# Patient Record
Sex: MALE | Race: WHITE | RURAL
[De-identification: names, ages, dates, MRNs, and addresses within clinical notes are randomized per-mention and may not be internally consistent; named-entity substitution may affect disease eponyms.]

---

## 2017-01-12 ENCOUNTER — TELEPHONE (OUTPATIENT)
Dept: INTERNAL MEDICINE CLINIC | Age: 72
End: 2017-01-12

## 2017-01-12 NOTE — TELEPHONE ENCOUNTER
Patients caregiver called saying if anything is needed for patient to give her a call. If he needs to be seen by Dr. James Jackson she would like a call. She can be reached at [phone].

## 2017-01-30 ENCOUNTER — OFFICE VISIT (OUTPATIENT)
Dept: INTERNAL MEDICINE CLINIC | Age: 72
End: 2017-01-30

## 2017-01-30 VITALS
SYSTOLIC BLOOD PRESSURE: 132 MMHG | DIASTOLIC BLOOD PRESSURE: 82 MMHG | HEART RATE: 102 BPM | WEIGHT: 170 LBS | OXYGEN SATURATION: 94 % | HEIGHT: 69 IN | RESPIRATION RATE: 18 BRPM | BODY MASS INDEX: 25.18 KG/M2 | TEMPERATURE: 95.6 F

## 2017-01-30 DIAGNOSIS — R32 INCONTINENCE: ICD-10-CM

## 2017-01-30 DIAGNOSIS — M54.40 CHRONIC LOW BACK PAIN WITH SCIATICA, SCIATICA LATERALITY UNSPECIFIED, UNSPECIFIED BACK PAIN LATERALITY: ICD-10-CM

## 2017-01-30 DIAGNOSIS — N39.0 URINARY TRACT INFECTION, SITE UNSPECIFIED: ICD-10-CM

## 2017-01-30 DIAGNOSIS — R25.1 TREMOR OF RIGHT HAND: Primary | ICD-10-CM

## 2017-01-30 DIAGNOSIS — G89.29 CHRONIC LOW BACK PAIN WITH SCIATICA, SCIATICA LATERALITY UNSPECIFIED, UNSPECIFIED BACK PAIN LATERALITY: ICD-10-CM

## 2017-01-30 DIAGNOSIS — R91.8 LUNG NODULES: ICD-10-CM

## 2017-01-30 PROBLEM — S48.912A AMPUTATION OF LEFT ARM (HCC): Status: ACTIVE | Noted: 2017-01-30

## 2017-01-30 RX ORDER — SULFAMETHOXAZOLE AND TRIMETHOPRIM 400; 80 MG/1; MG/1
1 TABLET ORAL 2 TIMES DAILY
Qty: 14 TAB | Refills: 0 | Status: SHIPPED | OUTPATIENT
Start: 2017-01-30 | End: 2017-02-06

## 2017-01-30 RX ORDER — ASPIRIN 325 MG
325 TABLET ORAL DAILY
Qty: 90 TAB | Refills: 3 | Status: SHIPPED | OUTPATIENT
Start: 2017-01-30

## 2017-01-30 NOTE — PROGRESS NOTES
HISTORY OF PRESENT ILLNESS  Gloria Block is a 66 y.o. male. Tremors   The history is provided by the patient and caregiver. This is a recurrent problem. The current episode started yesterday. The problem occurs constantly. The problem has not changed since onset. Pertinent negatives include no chest pain and no shortness of breath. here with his niece.  this occurred last year in November and she took him to [facility].   was diagnosed with a urinary tract infection and treated with an antibiotic. The tremor eventually resolved. He does not complain of dysuria. Today so far after drinking copious amounts of water has not been able to give us a urine sample. The tremor has left him unable to feed or use the restroom on his own. Given the left arm amputation this is a significant problem. Has had lung nodules seen on CAT scan of his lungs. Is scheduled to undergo biopsy at [address]. His aspirin has been stopped for the procedure. Is being seen at the [facility] for his chronic back pain. Currently changing his pain medications from oxycodone to methadone.   Past Medical History   Diagnosis Date    Allergic rhinitis     Amputation of arm ([address] left    Anxiety     Asthma     Below knee amputation status (HCC) right    Chronic obstructive pulmonary disease (HCC)     Depression     Hypercholesterolemia     Hypertension     Insomnia     Osteomyelitis of ankle, acute (HCC)     Polyneuropathy      Social History     Social History    Marital status:      Spouse name: N/A    Number of children: 3    Years of education: N/A     Occupational History    retired      Social History Main Topics    Smoking status: Current Every Day Smoker     Packs/day: 1.00     Types: Cigarettes     Start date: 6/1/1956    Smokeless tobacco: Not on file    Alcohol use No    Drug use: No    Sexual activity: No     Other Topics Concern    Not on file     Social History Narrative         Review of Systems   Respiratory: Negative for shortness of breath. Cardiovascular: Negative for chest pain. Genitourinary: Negative for dysuria. Neurological: Positive for tremors and focal weakness. Physical Exam  Visit Vitals    /82 (BP 1 Location: Left arm, BP Patient Position: Sitting)    Pulse (!) 102    Temp 95.6 °F (35.3 °C) (Oral)    Resp 18    Ht 5' 9\" (1.753 m)    Wt 170 lb (77.1 kg)    SpO2 94%    BMI 25.1 kg/m2     Well developed well nourished no acute distress. Pupils equal round react to light, extraocular muscles intact. Tympanic membranes within normal limits throat unremarkable  Cranial nerves II through XII are intact. Neck unremarkable  Heart regular rate and rhythm without clicks murmurs rubs  Lungs are clear to auscultation  Abdomen soft. Extremities, left arm amputation. Right arm distal tremor reduced with extension fourth and fifth fingers slightly flexed. Tremors when he attempts to drink or use the hand. ASSESSMENT and PLAN  Encounter Diagnoses   Name Primary?  Tremor of right hand Yes    Incontinence     Lung nodules     Urinary tract infection, site unspecified     Chronic low back pain with sciatica, sciatica laterality unspecified, unspecified back pain laterality      Orders Placed This Encounter    CULTURE, URINE    URINALYSIS W/ RFLX MICROSCOPIC    aspirin (ASPIRIN) 325 mg tablet    trimethoprim-sulfamethoxazole (BACTRIM, SEPTRA)  mg per tablet     Certainly am not sure what is causing this problem. We can try a course of antibiotics. With the help of his niece, he will try and give us a urine sample before starting the above antibiotics. She can take the urine to the closest medical facility for analysis and then start him on the antibiotic. Did discuss the possibility of stroke.   He is declining wanting to go to the emergency room, but if the symptoms worsen his niece will take him there. Back pain per pain management. His niece will call [facility] to delay his lung nodule biopsies. Follow-up Disposition:  Return in about 1 week (around 2/6/2017) for routine follow up.

## 2017-01-30 NOTE — MR AVS SNAPSHOT
Visit Information Date & Time Provider Department Dept. Phone Encounter #  
 1/30/2017 10:50 AM Les Palomo  Amende  [id number] Follow-up Instructions Return in about 1 week (around 2/6/2017) for routine follow up. Upcoming Health Maintenance Date Due DTaP/Tdap/Td series (1 - Tdap) 7/14/1964 FOBT Q 1 YEAR AGE 50-75 7/14/1993 ZOSTER VACCINE AGE 60> 7/14/2003 GLAUCOMA SCREENING Q2Y 7/14/2008 Pneumococcal 65+ Low/Medium Risk (1 of 2 - PCV13) 7/14/2008 MEDICARE YEARLY EXAM 7/14/2008 Allergies as of 1/30/2017  Review Complete On: 1/30/2017 By: Les Palomo MD  
  
 Severity Noted Reaction Type Reactions Morphine  03/07/2016    Other (comments)  
 patient states he can take 4/5/16 patient states he has a chemical reaction on Morphine Current Immunizations  Never Reviewed Name Date Influenza High Dose Vaccine PF 11/9/2016 Not reviewed this visit You Were Diagnosed With   
  
 Codes Comments Tremor of right hand    -  Primary ICD-10-CM: R25.1 ICD-9-CM: 781.0 Incontinence     ICD-10-CM: R32 
ICD-9-CM: 788.30 Vitals BP Pulse Temp Resp Height(growth percentile) Weight(growth percentile) 132/82 (BP 1 Location: Left arm, BP Patient Position: Sitting) (!) 102 95.6 °F (35.3 °C) (Oral) 18 5' 9\" (1.753 m) 170 lb (77.1 kg) SpO2 BMI Smoking Status 94% 25.1 kg/m2 Current Every Day Smoker BMI and BSA Data Body Mass Index Body Surface Area  
 25.1 kg/m 2 1.94 m 2 Preferred Pharmacy Pharmacy Name Phone [facility] [address] [facility], [address] [place] [address] Street  Your Updated Medication List  
  
   
This list is accurate as of: 1/30/17 11:48 AM.  Always use your most recent med list.  
  
  
  
  
 albuterol 90 mcg/actuation inhaler Commonly known as:  PROVENTIL HFA, VENTOLIN HFA, PROAIR HFA  
 Take 2 Puffs by inhalation every twelve (12) hours as needed for Wheezing. albuterol-ipratropium 2.5 mg-0.5 mg/3 ml Nebu Commonly known as:  DUO-NEB  
3 mL by Nebulization route every eight (8) hours as needed. Indications: CHRONIC OBSTRUCTIVE PULMONARY DISEASE WITH BRONCHOSPASMS  
  
 aspirin 325 mg tablet Commonly known as:  ASPIRIN Take 1 Tab by mouth daily. DULoxetine 30 mg capsule Commonly known as:  CYMBALTA Take 1 Cap by mouth two (2) times a day. Indications: ANXIETY WITH DEPRESSION  
  
 fluticasone 50 mcg/actuation nasal spray Commonly known as:  [place] 2 Sprays by Both Nostrils route daily. food supplemt, lactose-reduced Liqd Commonly known as:  ENSURE Take 237 mL by mouth three (3) times daily. loratadine 10 mg tablet Commonly known as:  CLARITIN  
TAKE 1 TABLET BY MOUTH EVERY DAY  
  
 oxyCODONE IR 15 mg immediate release tablet Commonly known as:  OXY-IR Take 1 Tab by mouth every eight (8) hours as needed for Pain. Max Daily Amount: 45 mg. This is the last narcotic prescription that I will write for the chronic pain condition  
  
 tamsulosin 0.4 mg capsule Commonly known as:  FLOMAX TAKE ONE CAPSULE BY MOUTH EVERY DAY  
  
 tiotropium 18 mcg inhalation capsule Commonly known as:  Blank Estimable Take 1 Cap by inhalation daily. traZODone 150 mg tablet Commonly known as:  Landa Theo Take 1 Tab by mouth nightly. triamcinolone acetonide 0.025 % topical cream  
Commonly known as:  KENALOG Apply 0.5 g to affected area two (2) times a day. use thin layer  
  
 trimethoprim-sulfamethoxazole  mg per tablet Commonly known as:  [place] Take 1 Tab by mouth two (2) times a day for 7 days. Prescriptions Sent to Pharmacy Refills  
 aspirin (ASPIRIN) 325 mg tablet 3 Sig: Take 1 Tab by mouth daily. Class: Normal  
 Pharmacy: [facility] [address] [place] [address] [address] Ph #: [phone] Route: Oral  
 trimethoprim-sulfamethoxazole (BACTRIM, SEPTRA)  mg per tablet 0 Sig: Take 1 Tab by mouth two (2) times a day for 7 days. Class: Normal  
 Pharmacy: [facility] [address] [place] [address] [address]  #: [phone] Route: Oral  
  
We Performed the Following CULTURE, URINE [id number] CPT(R)] URINALYSIS W/ RFLX MICROSCOPIC [44636 CPT(R)] Follow-up Instructions Return in about 1 week (around 2/6/2017) for routine follow up. Introducing [facility]! Shanda Bond introduces [employer] patient portal. Now you can access parts of your medical record, email your doctor's office, and request medication refills online. 1. In your internet browser, go to [email or web address] 2. Click on the First Time User? Click Here link in the Sign In box. You will see the New Member Sign Up page. 3. Enter your [employer] Access Code exactly as it appears below. You will not need to use this code after youve completed the sign-up process. If you do not sign up before the expiration date, you must request a new code. · [employer] Access Code: [email or web address] Expires: 1/31/2017  2:01 PM 
 
4. Enter the last four digits of your Social Security Number (xxxx) and Date of Birth (mm/dd/yyyy) as indicated and click Submit. You will be taken to the next sign-up page. 5. Create a [employer] ID. This will be your [employer] login ID and cannot be changed, so think of one that is secure and easy to remember. 6. Create a [employer] password. You can change your password at any time. 7. Enter your Password Reset Question and Answer. This can be used at a later time if you forget your password. 8. Enter your e-mail address. You will receive e-mail notification when new information is available in [address] 9. Click Sign Up. You can now view and download portions of your medical record.  
10. Click the Download Summary menu link to download a portable copy of your medical information. If you have questions, please visit the Frequently Asked Questions section of the [employer] website. Remember, [employer] is NOT to be used for urgent needs. For medical emergencies, dial 911. Now available from your iPhone and Android! Please provide this summary of care documentation to your next provider. If you have any questions after today's visit, please call [phone].

## 2017-01-30 NOTE — PROGRESS NOTES
Chief Complaint   Patient presents with    Tremors     tremor to R/hand, hand blas     I have reviewed the patient's medical history in detail and updated the computerized patient record. Health Maintenance reviewed. 1. Have you been to the ER, urgent care clinic since your last visit? Hospitalized since your last visit?no    2. Have you seen or consulted any other health care providers outside of the [address] [place] [address] since your last visit? Include any pap smears or colon screening.  no

## 2017-02-13 ENCOUNTER — TELEPHONE (OUTPATIENT)
Dept: INTERNAL MEDICINE CLINIC | Age: 72
End: 2017-02-13

## 2017-02-13 RX ORDER — CIPROFLOXACIN 500 MG/1
500 TABLET ORAL 2 TIMES DAILY
Qty: 20 TAB | Refills: 0 | Status: SHIPPED | OUTPATIENT
Start: 2017-02-13 | End: 2017-02-23

## 2017-02-13 NOTE — TELEPHONE ENCOUNTER
Cristina Gutierrez, called in reference to wanting to know if antibiotic that patient was using can be called in again. She said that he was doing better but is starting to feel bad again. Ms. Cristina Gutierrez said she doesn't think patient had enough of the medication. Please call her at [phone].

## 2017-02-13 NOTE — TELEPHONE ENCOUNTER
Return call and pt took his antib for a UTI, two days after fininishing the antib, all symtoms are coming back wanted to know if another round of [facility] be [facility]

## 2017-02-16 ENCOUNTER — TELEPHONE (OUTPATIENT)
Dept: INTERNAL MEDICINE CLINIC | Age: 72
End: 2017-02-16

## 2017-02-16 NOTE — TELEPHONE ENCOUNTER
Mika Zavala, called in reference to patient needing a new wheelchair. [facility] care fax is [phone] and [phone] is phone number. They need a prescription with reason medically necessary, and separate sheet specifying why its actually necessary that he cant use walker or crutches. Insurance information and demographics is needed. Please fax over this information.

## 2017-02-17 ENCOUNTER — TELEPHONE (OUTPATIENT)
Dept: INTERNAL MEDICINE CLINIC | Age: 72
End: 2017-02-17

## 2017-02-17 PROBLEM — Z89.511 STATUS POST BELOW KNEE AMPUTATION OF RIGHT LOWER EXTREMITY (HCC): Status: ACTIVE | Noted: 2017-02-17

## 2017-02-17 NOTE — TELEPHONE ENCOUNTER
Faxed snapshot, RX for W/C with diagnosis and ins information to [facility] [phone]

## 2017-03-02 ENCOUNTER — TELEPHONE (OUTPATIENT)
Dept: INTERNAL MEDICINE CLINIC | Age: 72
End: 2017-03-02

## 2017-03-02 NOTE — TELEPHONE ENCOUNTER
----- Message from Popeye Sandra sent at 3/2/2017 10:46 AM EST -----  Regarding: Np Fontaine/telephone  The patient's caregiver Shubham Ansari is requesting a call back from Marion Higginbotham.  (m)[phone]

## 2017-03-06 ENCOUNTER — TELEPHONE (OUTPATIENT)
Dept: INTERNAL MEDICINE CLINIC | Age: 72
End: 2017-03-06

## 2017-03-06 NOTE — TELEPHONE ENCOUNTER
Talked to niece and he has had a stroke last week and also has lung CA in both lungs which they are not going to treat due to the stroke. They will contact us when he can come in or need anything.

## 2017-03-06 NOTE — TELEPHONE ENCOUNTER
Patient would like to speak with a nurse in reference to what she should do with patient. She said he is completely immobile and he had an appt today but she could not bring him. Please call her at [phone] or [phone].

## 2017-03-06 NOTE — TELEPHONE ENCOUNTER
Price, from [facility] care [facility], called in reference to wanting to know status of certificate of medical necessity. Please call [phone].

## 2017-03-15 ENCOUNTER — TELEPHONE (OUTPATIENT)
Dept: INTERNAL MEDICINE CLINIC | Age: 72
End: 2017-03-15

## 2017-03-15 DIAGNOSIS — S48.912A AMPUTATION OF LEFT ARM (HCC): ICD-10-CM

## 2017-03-15 DIAGNOSIS — Z89.511 STATUS POST BELOW KNEE AMPUTATION OF RIGHT LOWER EXTREMITY (HCC): Primary | ICD-10-CM

## 2017-03-15 NOTE — TELEPHONE ENCOUNTER
Mary, called in reference to patient needing a new wheelchair (electric). [facility] care fax is [phone] and [phone] is phone number. They need a prescription with reason medically necessary, and separate sheet specifying why its actually necessary that he cant use walker or crutches. Insurance information and demographics is needed. Please fax over this information. [facility] also called wanting to speak with Sophy Blank. Please call her at  [phone].

## 2017-03-21 NOTE — TELEPHONE ENCOUNTER
Faxed to [facility] [phone] amb order for pt's W/C and reason why.   Called Teodoro and could not leave a message

## 2017-04-07 ENCOUNTER — TELEPHONE (OUTPATIENT)
Dept: INTERNAL MEDICINE CLINIC | Age: 72
End: 2017-04-07

## 2017-04-07 DIAGNOSIS — N39.0 URINARY TRACT INFECTION WITHOUT HEMATURIA, SITE UNSPECIFIED: Primary | ICD-10-CM

## 2017-04-07 NOTE — TELEPHONE ENCOUNTER
----- Message from Darby Kaur sent at 4/7/2017 10:37 AM EDT -----  Regarding: Dr. Gudelia Herrera([facility]) would like a call about pt's antibodies that will be done on 04/09/17, and can pick line be pulled. Best contact number [phone].

## 2017-04-10 ENCOUNTER — PATIENT OUTREACH (OUTPATIENT)
Dept: INTERNAL MEDICINE CLINIC | Age: 72
End: 2017-04-10

## 2017-04-10 NOTE — TELEPHONE ENCOUNTER
D/C summary sent to Dr. Angel Adorno, niece called suggesting to culture the urine before pick line is D/C due to the face pt has had this done before and needed pick line reinserted.

## 2017-04-10 NOTE — PROGRESS NOTES
Jose Dickey is a 66 y.o. male   This patient was received as a referral from provider referral   Summary of patients top three medical problems:     Problem 1: Stroke     Problem 2: Left arm amputee and Right BKA     Problem 3: Lung Cancer    Patient's challenges to self management identified: support system   Spoke with patient's niece, Osmani(on PHI) about patient. Alejo Closs is patients caregiver. Patient had stroke 2/24/2017 and now unable to use right hand. She assist pt with meal, meds bathing, all ADLs. Patient is in wheelchair. Patient has also been suffering with recurrent UTI, he currently has PICC placed, finished IV med. Alejo Closs called here and spoke to [place] about doing a Urine culture and another round of IV meds before the PICC is d/c's. Alejo Closs takes care of patient herself without additional help, but she is seeking assistance. She would like for him to remain in her home, she states she removed him from SNF 3 years ago. Patient has also been diagnosed with \"squamous cell lung cancer\" per Osmani. She states biopsy was done through [facility] and he will not be receiving treatment for the cancer. Ronnie Sayer I will follow up with her wednesday    Medication Management:  Niece assist patient with meds. Advance Care Planning:   Patient was offered the opportunity to discuss advance care planning:  no     Does patient have an Advance Directive:  no   If no, did you provide information on Advance Care Planning?  no           Goals      Supportive resources in place to maintain patient in the community (ie. Home Health, DME equipment, refer to, medication assistant plan, etc.)                 This note will not be viewable in [address]

## 2017-04-12 ENCOUNTER — PATIENT OUTREACH (OUTPATIENT)
Dept: INTERNAL MEDICINE CLINIC | Age: 72
End: 2017-04-12

## 2017-04-12 NOTE — PROGRESS NOTES
LVM, no returned call today. Sent letter and request for records from [facility] for pt to sign. 4/25/2017 Spoke with Margret Garces. She is open to see if she can get more assistance. Home Health has ended. Pt states her situation has become more stressful, her child, the spouse and their children have moved in. Talked about [place] assisting get home aid but pt stated today \"Would that mess with my benefits? \". Pt receiving benefits to take care of pt. Will explore and options and find information and get back in touch,  verbalized understanding and denies questions. This note will not be viewable in [address]

## 2017-04-14 ENCOUNTER — TELEPHONE (OUTPATIENT)
Dept: INTERNAL MEDICINE CLINIC | Age: 72
End: 2017-04-14

## 2017-04-14 NOTE — TELEPHONE ENCOUNTER
----- Message from Alexander Lee sent at 4/14/2017 11:12 AM EDT -----  Regarding: Dr. Lee Ann Mcclendon: [phone]  [facility] a nurse with [facility] stated that she would like a call back in regards to the patients Urine Analyst. She stated that her best contact number is [phone].

## 2017-04-17 ENCOUNTER — TELEPHONE (OUTPATIENT)
Dept: INTERNAL MEDICINE CLINIC | Age: 72
End: 2017-04-17

## 2017-04-17 NOTE — TELEPHONE ENCOUNTER
Kailee, from [facility], called in reference to wanting to know if patients pic line can be taken out. A urinalysis was requested last week before taking the line out. Please call her at [phone].

## 2017-04-18 NOTE — TELEPHONE ENCOUNTER
Catracho Taylor, from [facility], called in reference to status on patient's pic line. Please call her at [phone].

## 2017-04-19 NOTE — TELEPHONE ENCOUNTER
Geno, from [facility], called in reference to pts pic. The results came back negative for growth. She will be going there today at 11:30 and if pic is to be removed orders need to be faxed to [phone].

## 2017-04-26 ENCOUNTER — TELEPHONE (OUTPATIENT)
Dept: INTERNAL MEDICINE CLINIC | Age: 72
End: 2017-04-26

## 2017-04-26 PROBLEM — C34.91 MALIGNANT NEOPLASM OF RIGHT LUNG (HCC): Status: ACTIVE | Noted: 2017-04-26

## 2017-04-26 RX ORDER — NYSTATIN 100000 [USP'U]/G
POWDER TOPICAL 4 TIMES DAILY
Qty: 1 BOTTLE | Refills: 5 | Status: SHIPPED | OUTPATIENT
Start: 2017-04-26

## 2017-04-26 NOTE — TELEPHONE ENCOUNTER
Saw patient today - wants to notify Dr Evelin Ellison that patients heart rate was at 110 - he has a yeast in his groin - she would like a prescription for Nystatin powder BID to [facility] in [facility], LPN  5/33/3522  2:67 PM

## 2017-04-27 RX ORDER — CLOPIDOGREL BISULFATE 75 MG/1
75 TABLET ORAL DAILY
Qty: 30 TAB | Refills: 3 | Status: CANCELLED | OUTPATIENT
Start: 2017-04-27

## 2017-04-27 NOTE — TELEPHONE ENCOUNTER
Requested Prescriptions     Pending Prescriptions Disp Refills    clopidogrel (PLAVIX) 75 mg tab 30 Tab 3     Sig: Take 1 Tab by mouth daily. Jackie Mcclure, would also like a call from [place] at [phone].

## 2017-04-28 NOTE — TELEPHONE ENCOUNTER
Requested Prescriptions     Pending Prescriptions Disp Refills    clopidogrel (PLAVIX) 75 mg tab 30 Tab 3     Sig: Take 1 Tab by mouth daily. Patient has not been on this medication since 03/07/2016 and it was ordered by Dr. Cira Pike.

## 2017-04-29 DIAGNOSIS — Z86.73 HISTORY OF CVA (CEREBROVASCULAR ACCIDENT): Primary | ICD-10-CM

## 2017-04-29 RX ORDER — CLOPIDOGREL BISULFATE 75 MG/1
75 TABLET ORAL DAILY
Qty: 30 TAB | Refills: 1 | Status: SHIPPED | OUTPATIENT
Start: 2017-04-29

## 2017-05-12 ENCOUNTER — DOCUMENTATION ONLY (OUTPATIENT)
Dept: INTERNAL MEDICINE CLINIC | Age: 72
End: 2017-05-12

## 2017-05-30 ENCOUNTER — PATIENT OUTREACH (OUTPATIENT)
Dept: INTERNAL MEDICINE CLINIC | Age: 72
End: 2017-05-30

## 2017-06-16 ENCOUNTER — TELEPHONE (OUTPATIENT)
Dept: INTERNAL MEDICINE CLINIC | Age: 72
End: 2017-06-16

## 2017-06-16 NOTE — TELEPHONE ENCOUNTER
Chief Complaint   Patient presents with    Returning Call     [facility] [place] [facility] AT [place] wishes to speak with Nicole Burgos. Call transferred to Nicole Burgos.

## 2017-06-16 NOTE — TELEPHONE ENCOUNTER
Socorro Malone, called in reference to wanting to speak with nurse to give an update on patient. She said if she doesn't answer call right back in case doesn't hear the phone the first time. Santos Rayo can be reached at [phone].

## 2018-05-15 NOTE — LETTER
4/12/2017 4:29 PM 
 
 
Ms. Noland Patient Mr. Mary Saul [address] [place] [address] Road [address] Dear Ms. [address] [facility], My name is Reshma Ayala. I am the care manager/nurse navigator at [facility]. I have been unable to get back in touch with you since our initial call. Please contact me at [phone] regarding our recent call. I am also sending with this a release of information form for [facility] so we may get records from them. If you can fax(our fax number is [phone]), mail or drop it off at your earliest convenience. Thank you. Sincerely,  
 
 
 
 
Reshma Ayala, RN Ambulatory Nurse Navigator [address]  yes

## 2022-02-16 NOTE — TELEPHONE ENCOUNTER
Addended by: MEMO SHEPARD on: 2/16/2022 01:21 PM     Modules accepted: Orders     Called [place] [address], [place] talked to Mago Segundo [phone]    Verbal order given to Mago Segundo to get a UA and CX pt's urine.   Hold in D/C pick line

## 2023-10-13 NOTE — ACP (ADVANCE CARE PLANNING)
Pt stated that they DO NOT HAVE AN ADVANCED DIRECTIVE
Quality 402: Tobacco Use And Help With Quitting Among Adolescents: Patient screened for tobacco and never smoked
Detail Level: Detailed